# Patient Record
Sex: MALE | Race: ASIAN | NOT HISPANIC OR LATINO | Employment: UNEMPLOYED | URBAN - METROPOLITAN AREA
[De-identification: names, ages, dates, MRNs, and addresses within clinical notes are randomized per-mention and may not be internally consistent; named-entity substitution may affect disease eponyms.]

---

## 2018-10-24 ENCOUNTER — TELEPHONE (OUTPATIENT)
Dept: FAMILY MEDICINE | Facility: CLINIC | Age: 38
End: 2018-10-24

## 2018-10-24 NOTE — TELEPHONE ENCOUNTER
Pre-Visit Chart Review  For Appointment Scheduled on 10/25/18    There are no preventive care reminders to display for this patient.

## 2018-10-25 ENCOUNTER — OFFICE VISIT (OUTPATIENT)
Dept: FAMILY MEDICINE | Facility: CLINIC | Age: 38
End: 2018-10-25
Payer: COMMERCIAL

## 2018-10-25 ENCOUNTER — TELEPHONE (OUTPATIENT)
Dept: FAMILY MEDICINE | Facility: CLINIC | Age: 38
End: 2018-10-25

## 2018-10-25 ENCOUNTER — HOSPITAL ENCOUNTER (OUTPATIENT)
Dept: RADIOLOGY | Facility: CLINIC | Age: 38
Discharge: HOME | End: 2018-10-25
Attending: FAMILY MEDICINE
Payer: COMMERCIAL

## 2018-10-25 ENCOUNTER — LAB VISIT (OUTPATIENT)
Dept: LAB | Facility: HOSPITAL | Age: 38
End: 2018-10-25
Attending: FAMILY MEDICINE
Payer: COMMERCIAL

## 2018-10-25 VITALS
DIASTOLIC BLOOD PRESSURE: 60 MMHG | HEART RATE: 70 BPM | HEIGHT: 67 IN | TEMPERATURE: 98 F | SYSTOLIC BLOOD PRESSURE: 113 MMHG | BODY MASS INDEX: 26.64 KG/M2 | WEIGHT: 169.75 LBS

## 2018-10-25 DIAGNOSIS — M54.41 CHRONIC BILATERAL LOW BACK PAIN WITH BILATERAL SCIATICA: ICD-10-CM

## 2018-10-25 DIAGNOSIS — M54.42 CHRONIC BILATERAL LOW BACK PAIN WITH BILATERAL SCIATICA: ICD-10-CM

## 2018-10-25 DIAGNOSIS — G89.29 CHRONIC BILATERAL LOW BACK PAIN WITH BILATERAL SCIATICA: ICD-10-CM

## 2018-10-25 DIAGNOSIS — R34 OLIGURIA: ICD-10-CM

## 2018-10-25 LAB
ALBUMIN SERPL BCP-MCNC: 4.3 G/DL
ALP SERPL-CCNC: 73 U/L
ALT SERPL W/O P-5'-P-CCNC: 14 U/L
ANION GAP SERPL CALC-SCNC: 7 MMOL/L
AST SERPL-CCNC: 19 U/L
BASOPHILS # BLD AUTO: 0.05 K/UL
BASOPHILS NFR BLD: 0.8 %
BILIRUB SERPL-MCNC: 0.4 MG/DL
BUN SERPL-MCNC: 18 MG/DL
CALCIUM SERPL-MCNC: 10 MG/DL
CHLORIDE SERPL-SCNC: 105 MMOL/L
CHOLEST SERPL-MCNC: 198 MG/DL
CHOLEST/HDLC SERPL: 4.4 {RATIO}
CO2 SERPL-SCNC: 28 MMOL/L
CREAT SERPL-MCNC: 0.9 MG/DL
DIFFERENTIAL METHOD: NORMAL
EOSINOPHIL # BLD AUTO: 0.4 K/UL
EOSINOPHIL NFR BLD: 5.8 %
ERYTHROCYTE [DISTWIDTH] IN BLOOD BY AUTOMATED COUNT: 12.5 %
EST. GFR  (AFRICAN AMERICAN): >60 ML/MIN/1.73 M^2
EST. GFR  (NON AFRICAN AMERICAN): >60 ML/MIN/1.73 M^2
FOLATE SERPL-MCNC: 5.9 NG/ML
GLUCOSE SERPL-MCNC: 87 MG/DL
HCT VFR BLD AUTO: 46 %
HDLC SERPL-MCNC: 45 MG/DL
HDLC SERPL: 22.7 %
HGB BLD-MCNC: 14.8 G/DL
IMM GRANULOCYTES # BLD AUTO: 0.01 K/UL
IMM GRANULOCYTES NFR BLD AUTO: 0.2 %
LDLC SERPL CALC-MCNC: 107 MG/DL
LYMPHOCYTES # BLD AUTO: 2 K/UL
LYMPHOCYTES NFR BLD: 33.3 %
MCH RBC QN AUTO: 30 PG
MCHC RBC AUTO-ENTMCNC: 32.2 G/DL
MCV RBC AUTO: 93 FL
MONOCYTES # BLD AUTO: 0.4 K/UL
MONOCYTES NFR BLD: 6.3 %
NEUTROPHILS # BLD AUTO: 3.3 K/UL
NEUTROPHILS NFR BLD: 53.6 %
NONHDLC SERPL-MCNC: 153 MG/DL
NRBC BLD-RTO: 0 /100 WBC
PLATELET # BLD AUTO: 237 K/UL
PMV BLD AUTO: 11 FL
POTASSIUM SERPL-SCNC: 4.3 MMOL/L
PROT SERPL-MCNC: 7.9 G/DL
RBC # BLD AUTO: 4.93 M/UL
RHEUMATOID FACT SERPL-ACNC: <10 IU/ML
SODIUM SERPL-SCNC: 140 MMOL/L
TRIGL SERPL-MCNC: 230 MG/DL
TSH SERPL DL<=0.005 MIU/L-ACNC: 0.62 UIU/ML
VIT B12 SERPL-MCNC: 742 PG/ML
WBC # BLD AUTO: 6.07 K/UL

## 2018-10-25 PROCEDURE — 84443 ASSAY THYROID STIM HORMONE: CPT

## 2018-10-25 PROCEDURE — 82746 ASSAY OF FOLIC ACID SERUM: CPT

## 2018-10-25 PROCEDURE — 3008F BODY MASS INDEX DOCD: CPT | Mod: CPTII,S$GLB,, | Performed by: FAMILY MEDICINE

## 2018-10-25 PROCEDURE — 72100 X-RAY EXAM L-S SPINE 2/3 VWS: CPT | Mod: 26,,, | Performed by: RADIOLOGY

## 2018-10-25 PROCEDURE — 86592 SYPHILIS TEST NON-TREP QUAL: CPT

## 2018-10-25 PROCEDURE — 80074 ACUTE HEPATITIS PANEL: CPT

## 2018-10-25 PROCEDURE — 86431 RHEUMATOID FACTOR QUANT: CPT

## 2018-10-25 PROCEDURE — 36415 COLL VENOUS BLD VENIPUNCTURE: CPT | Mod: PO

## 2018-10-25 PROCEDURE — 85025 COMPLETE CBC W/AUTO DIFF WBC: CPT

## 2018-10-25 PROCEDURE — 82607 VITAMIN B-12: CPT

## 2018-10-25 PROCEDURE — 99214 OFFICE O/P EST MOD 30 MIN: CPT | Mod: S$GLB,,, | Performed by: FAMILY MEDICINE

## 2018-10-25 PROCEDURE — 80053 COMPREHEN METABOLIC PANEL: CPT

## 2018-10-25 PROCEDURE — 86038 ANTINUCLEAR ANTIBODIES: CPT

## 2018-10-25 PROCEDURE — 80061 LIPID PANEL: CPT

## 2018-10-25 PROCEDURE — 99999 PR PBB SHADOW E&M-EST. PATIENT-LVL III: CPT | Mod: PBBFAC,,, | Performed by: FAMILY MEDICINE

## 2018-10-25 PROCEDURE — 72100 X-RAY EXAM L-S SPINE 2/3 VWS: CPT | Mod: TC,FY,PO

## 2018-10-25 NOTE — TELEPHONE ENCOUNTER
----- Message from Davi Monroy sent at 10/23/2018  9:20 AM CDT -----  Contact: self   Type:  Sooner Apoointment Request    Caller is requesting a sooner appointment.  Caller declined first available appointment listed below.  Caller will not accept being placed on the waitlist and is requesting a message be sent to doctor.    Name of Caller:  Patient   When is the first available appointment: February   Symptoms:  Est care back and kidney pain,   Best Call Back Number:  [phone] (home)        Additional Information: patient is requesting to be seen this week with a Dr, also would like a xray done on back and kidney

## 2018-10-25 NOTE — PROGRESS NOTES
Subjective:   Patient ID: Preet Barrientos is a 38 y.o. male     Chief Complaint:Establish Care (lower back pain, trouble urinating, urinating frequency with low volume)      Pt with chronic lower back pain that has been going on for years. Typically flares up every 3-4 months depneding on work schedule. Pt notes that when flares is unable to leave home for a week or so. Pt states pain is achey sever pain with shooting pains that radiate down back both legs. Has taken advil and tylenol with limited improvement. Pt also notes that he has been having decreased urination. Pt works as a  and sometimes works for multiple months without weekdays off. Works in [place]      Review of Systems   Constitutional: Negative for chills and fever.   HENT: Negative for sore throat.    Eyes: Negative for visual disturbance.   Respiratory: Negative for shortness of breath.    Cardiovascular: Negative for chest pain.   Gastrointestinal: Negative for abdominal pain.   Endocrine: Negative for polyuria.   Genitourinary: Positive for difficulty urinating and dysuria.   Musculoskeletal: Positive for back pain.   Skin: Negative for color change.   Neurological: Negative for headaches.   Psychiatric/Behavioral: Negative for agitation and confusion.     History reviewed. No pertinent past medical history.  Objective:     Vitals:    10/25/18 1054   BP: 113/60   Pulse: 70   Temp: 98 °F (36.7 °C)     Body mass index is 26.59 kg/m².  Physical Exam   Constitutional: He is oriented to person, place, and time. He appears well-developed and well-nourished.   HENT:   Head: Normocephalic and atraumatic.   Eyes: EOM are normal. Pupils are equal, round, and reactive to light.   Neck: Normal range of motion. Neck supple.   Cardiovascular: Normal rate, regular rhythm, normal heart sounds and intact distal pulses.   Pulmonary/Chest: Effort normal and breath sounds normal.   Abdominal: Soft. He exhibits no distension.   Musculoskeletal: Normal range of  motion.   Neurological: He is alert and oriented to person, place, and time.   Skin: Skin is warm and dry.   Psychiatric: He has a normal mood and affect. His behavior is normal. Judgment and thought content normal.   Nursing note and vitals reviewed.    Assessment:     1. Chronic bilateral low back pain with bilateral sciatica    2. Oliguria      Plan:   Chronic bilateral low back pain with bilateral sciatica  -     NELLA; Future; Expected date: 10/25/2018  -     Rheumatoid factor; Future; Expected date: 10/25/2018  -     X-Ray Lumbar Spine AP And Lateral; Future; Expected date: 10/25/2018  - review of xray shows narrowing of dis space at L4-L5      Oliguria  -     CBC auto differential; Future; Expected date: 10/25/2018  -     Comprehensive metabolic panel; Future; Expected date: 10/25/2018  -     HEPATITIS PANEL, ACUTE; Future; Expected date: 10/25/2018  -     TSH; Future; Expected date: 10/25/2018  -     Urinalysis; Future; Expected date: 10/25/2018  -     Vitamin B12; Future; Expected date: 10/25/2018  -     Folate; Future; Expected date: 10/25/2018  -     RPR; Future; Expected date: 10/25/2018  -     Lipid panel; Future; Expected date: 10/25/2018  - with patient symptoms of oliguria are consider about kidney dysfucntion  - pt also reports dark urine concerning for liver disease presentaiton with bilirubin in urine  - will order labwork to evaluate kidney dysfucntion as well as liver disease due to these presentations  - will followup with patient for possible further workup        Discharge:   Time spent with patient: 30 minutes and over half of that time was spent on counseling an coordination of care.    Barriers to medications present (no )    Adverse reactions to current medications (no)    Over the counter medications reviewed (Yes)      Juan Carlos Benitez MD  10/25/2018

## 2018-10-26 LAB
ANA SER QL IF: NORMAL
HAV IGM SERPL QL IA: NEGATIVE
HBV CORE IGM SERPL QL IA: NEGATIVE
HBV SURFACE AG SERPL QL IA: NEGATIVE
HCV AB SERPL QL IA: NEGATIVE
RPR SER QL: NORMAL

## 2018-10-29 ENCOUNTER — TELEPHONE (OUTPATIENT)
Dept: FAMILY MEDICINE | Facility: CLINIC | Age: 38
End: 2018-10-29

## 2018-10-29 NOTE — TELEPHONE ENCOUNTER
----- Message from Hiram Mahoney sent at 10/29/2018  8:38 AM CDT -----  Type: Needs Medical Advice    Who Called:  Patient      Best Call Back Number: [phone]  Additional Information: Patient calling.  States that he would like a letter that would allow him to return to work to be sent to his email address.requesting excuse of work note to return on 10/29 be email to [email or web address],requesting a call back with test results   He also states that he would like any prescription info or test results to be sent to his email address as well.    Patient states that he works nights and would like any phone calls to be made between 2 and 5 pm.

## 2020-06-29 ENCOUNTER — TELEPHONE (OUTPATIENT)
Dept: FAMILY MEDICINE | Facility: CLINIC | Age: 40
End: 2020-06-29

## 2020-06-29 NOTE — TELEPHONE ENCOUNTER
----- Message from Carolyn Mclean sent at 6/29/2020 12:57 PM CDT -----  Type:  Sooner Apoointment Request    Caller is requesting a sooner appointment.  Caller declined first available appointment listed below.  Caller will not accept being placed on the waitlist and is requesting a message be sent to doctor.    Name of Caller:  Patient  When is the first available appointment?  8/7  Symptoms:  cough  Best Call Back Number:    Additional Information:  please advise-thank you

## 2020-06-29 NOTE — TELEPHONE ENCOUNTER
----- Message from Tanvi Eng sent at 6/29/2020  3:06 PM CDT -----  Regarding: Pt - Orders for Covid  Contact: [phone]  Type: Needs Medical Advice  Who Called:  Patient  Best Call Back Number: [phone]  Additional Information: patient is requesting for an order to be tested for covid

## 2020-07-01 ENCOUNTER — TELEPHONE (OUTPATIENT)
Dept: FAMILY MEDICINE | Facility: CLINIC | Age: 40
End: 2020-07-01

## 2020-07-01 ENCOUNTER — OFFICE VISIT (OUTPATIENT)
Dept: PRIMARY CARE CLINIC | Facility: CLINIC | Age: 40
End: 2020-07-01
Payer: COMMERCIAL

## 2020-07-01 ENCOUNTER — APPOINTMENT (OUTPATIENT)
Dept: LAB | Facility: HOSPITAL | Age: 40
End: 2020-07-01
Attending: NURSE PRACTITIONER
Payer: COMMERCIAL

## 2020-07-01 VITALS
OXYGEN SATURATION: 99 % | SYSTOLIC BLOOD PRESSURE: 119 MMHG | HEART RATE: 77 BPM | TEMPERATURE: 98 F | RESPIRATION RATE: 18 BRPM | DIASTOLIC BLOOD PRESSURE: 70 MMHG

## 2020-07-01 DIAGNOSIS — J02.9 SORE THROAT: Primary | ICD-10-CM

## 2020-07-01 DIAGNOSIS — Z03.818 ENCOUNTER FOR OBSERVATION FOR SUSPECTED EXPOSURE TO OTHER BIOLOGICAL AGENTS RULED OUT: ICD-10-CM

## 2020-07-01 LAB — GROUP A STREP, MOLECULAR: NEGATIVE

## 2020-07-01 PROCEDURE — U0003 INFECTIOUS AGENT DETECTION BY NUCLEIC ACID (DNA OR RNA); SEVERE ACUTE RESPIRATORY SYNDROME CORONAVIRUS 2 (SARS-COV-2) (CORONAVIRUS DISEASE [COVID-19]), AMPLIFIED PROBE TECHNIQUE, MAKING USE OF HIGH THROUGHPUT TECHNOLOGIES AS DESCRIBED BY CMS-2020-01-R: HCPCS

## 2020-07-01 PROCEDURE — 87651 STREP A DNA AMP PROBE: CPT

## 2020-07-01 PROCEDURE — 99212 PR OFFICE/OUTPT VISIT, EST, LEVL II, 10-19 MIN: ICD-10-PCS | Mod: S$GLB,,, | Performed by: NURSE PRACTITIONER

## 2020-07-01 PROCEDURE — 99212 OFFICE O/P EST SF 10 MIN: CPT | Mod: S$GLB,,, | Performed by: NURSE PRACTITIONER

## 2020-07-01 NOTE — PATIENT INSTRUCTIONS

## 2020-07-01 NOTE — PROGRESS NOTES
Subjective:        Time seen by provider: 11:56 AM on 07/01/2020    Preet Barrientos is a 39 y.o. male who presents for an evaluation of possible COVID-19. He complains of a sore throat x1 day and a mild cough this morning. The patient denies taking daily medication, fever, or any other symptoms at this time. No pulmonary PMHx or PSHx. No known drug allergies.     Review of Systems   Constitutional: Negative for activity change, appetite change, fatigue and fever.   HENT: Positive for sore throat. Negative for congestion and rhinorrhea.    Respiratory: Positive for cough. Negative for chest tightness, shortness of breath and wheezing.    Cardiovascular: Negative for chest pain and palpitations.   Gastrointestinal: Negative for diarrhea, nausea and vomiting.   Musculoskeletal: Negative for arthralgias and myalgias.   Skin: Negative for rash.   Neurological: Negative for weakness, light-headedness and headaches.       Objective:      Physical Exam  Vitals signs and nursing note reviewed.   Constitutional:       General: He is not in acute distress.     Appearance: He is well-developed. He is not diaphoretic.   HENT:      Head: Normocephalic and atraumatic.      Nose: Nose normal.      Mouth/Throat:      Pharynx: Uvula midline. Posterior oropharyngeal erythema present. No oropharyngeal exudate.   Eyes:      Conjunctiva/sclera: Conjunctivae normal.   Neck:      Musculoskeletal: Normal range of motion.      Trachea: Phonation normal.   Cardiovascular:      Rate and Rhythm: Normal rate and regular rhythm.      Heart sounds: Normal heart sounds. No murmur.   Pulmonary:      Effort: Pulmonary effort is normal. No respiratory distress.      Breath sounds: Normal breath sounds. No wheezing.   Musculoskeletal: Normal range of motion.   Skin:     General: Skin is warm and dry.   Neurological:      Mental Status: He is alert and oriented to person, place, and time.         Assessment:       1. Viral URI  Plan:       1. Viral URI  -  The patient appears to have a viral upper respiratory infection.  COVID-19 TEST PENDING.  RAPID STREP TEST NEGATIVE.  I DO NOT THINK STREP PHARYNGITIS.  Based upon the history and physical exam the patient does not appear to have a serious bacterial infection such as pneumonia, sepsis, otitis media, bacterial sinusitis, strep pharyngitis, parapharyngeal or peritonsillar abscess, meningitis.  Patient appears very well and I have given specific return precautions to the patient and/or family members.  The patient can take over the counter medications and does not appear to need antibiotics at this time.     2. Discharge home and await results.   3. Return to clinic or ED for new or worsening symptoms.   4. Follow-up with PCP as needed.     Scribe Attestation:   I, Edith Wong, am scribing for, and in the presence of, GABBY Claudio. I performed the above scribed service and the documentation accurately describes the services I performed. I attest to the accuracy of the note.    I, GABBY Claudio, personally performed the services described in this documentation. All medical record entries made by the scribe were at my direction and in my presence.  I have reviewed the chart and agree that the record reflects my personal performance and is accurate and complete. GABBY Claudio.  1:37 PM 07/01/2020

## 2020-07-01 NOTE — TELEPHONE ENCOUNTER
Patient has not been seen in over a year. Rescheduled patient's visit to sooner to discuss with provider.

## 2020-07-01 NOTE — TELEPHONE ENCOUNTER
----- Message from Tanvi Eng sent at 7/1/2020  9:09 AM CDT -----  Regarding: Pt Order for Xray and  Type: Needs Medical Advice  Who Called:  Patient    Best Call Back Number: [phone]  Additional Information: Patient is also requesting for an order for Xray and US. Please call back and advise

## 2020-07-01 NOTE — TELEPHONE ENCOUNTER
----- Message from Tanvi Eng sent at 7/1/2020  9:07 AM CDT -----  Regarding: Patient Order COVID  Contact: [phone]  Type: Needs Medical Advice  Who Called:  Pt    Best Call Back Number: [phone]    Additional Information: Patient is requesting an order for COVID test. Please call back and advise

## 2020-07-02 LAB — SARS-COV-2 RNA RESP QL NAA+PROBE: NOT DETECTED

## 2020-07-06 ENCOUNTER — HOSPITAL ENCOUNTER (OUTPATIENT)
Dept: RADIOLOGY | Facility: CLINIC | Age: 40
Discharge: HOME | End: 2020-07-06
Attending: NURSE PRACTITIONER
Payer: COMMERCIAL

## 2020-07-06 ENCOUNTER — LAB VISIT (OUTPATIENT)
Dept: LAB | Facility: HOSPITAL | Age: 40
End: 2020-07-06
Attending: NURSE PRACTITIONER
Payer: COMMERCIAL

## 2020-07-06 ENCOUNTER — OFFICE VISIT (OUTPATIENT)
Dept: FAMILY MEDICINE | Facility: CLINIC | Age: 40
End: 2020-07-06
Payer: COMMERCIAL

## 2020-07-06 VITALS
HEART RATE: 62 BPM | SYSTOLIC BLOOD PRESSURE: 128 MMHG | DIASTOLIC BLOOD PRESSURE: 80 MMHG | TEMPERATURE: 98 F | OXYGEN SATURATION: 98 % | HEIGHT: 67 IN | WEIGHT: 169.31 LBS | BODY MASS INDEX: 26.57 KG/M2

## 2020-07-06 DIAGNOSIS — R06.02 SOB (SHORTNESS OF BREATH) ON EXERTION: ICD-10-CM

## 2020-07-06 DIAGNOSIS — Z00.00 ANNUAL PHYSICAL EXAM: Primary | ICD-10-CM

## 2020-07-06 DIAGNOSIS — Z00.00 ANNUAL PHYSICAL EXAM: ICD-10-CM

## 2020-07-06 DIAGNOSIS — F17.200 NEEDS SMOKING CESSATION EDUCATION: ICD-10-CM

## 2020-07-06 LAB
ALBUMIN SERPL BCP-MCNC: 4.4 G/DL (ref 3.5–5.2)
ALP SERPL-CCNC: 68 U/L (ref 55–135)
ALT SERPL W/O P-5'-P-CCNC: 18 U/L (ref 10–44)
ANION GAP SERPL CALC-SCNC: 7 MMOL/L (ref 8–16)
AST SERPL-CCNC: 20 U/L (ref 10–40)
BASOPHILS # BLD AUTO: 0.05 K/UL (ref 0–0.2)
BASOPHILS NFR BLD: 0.7 % (ref 0–1.9)
BILIRUB SERPL-MCNC: 0.3 MG/DL (ref 0.1–1)
BUN SERPL-MCNC: 11 MG/DL (ref 6–20)
CALCIUM SERPL-MCNC: 10 MG/DL (ref 8.7–10.5)
CHLORIDE SERPL-SCNC: 103 MMOL/L (ref 95–110)
CHOLEST SERPL-MCNC: 249 MG/DL (ref 120–199)
CHOLEST/HDLC SERPL: 4.5 {RATIO} (ref 2–5)
CO2 SERPL-SCNC: 29 MMOL/L (ref 23–29)
CREAT SERPL-MCNC: 0.9 MG/DL (ref 0.5–1.4)
DIFFERENTIAL METHOD: ABNORMAL
EOSINOPHIL # BLD AUTO: 0.4 K/UL (ref 0–0.5)
EOSINOPHIL NFR BLD: 6.1 % (ref 0–8)
ERYTHROCYTE [DISTWIDTH] IN BLOOD BY AUTOMATED COUNT: 12.6 % (ref 11.5–14.5)
EST. GFR  (AFRICAN AMERICAN): >60 ML/MIN/1.73 M^2
EST. GFR  (NON AFRICAN AMERICAN): >60 ML/MIN/1.73 M^2
GLUCOSE SERPL-MCNC: 96 MG/DL (ref 70–110)
HCT VFR BLD AUTO: 47.3 % (ref 40–54)
HDLC SERPL-MCNC: 55 MG/DL (ref 40–75)
HDLC SERPL: 22.1 % (ref 20–50)
HGB BLD-MCNC: 15 G/DL (ref 14–18)
IMM GRANULOCYTES # BLD AUTO: 0.02 K/UL (ref 0–0.04)
IMM GRANULOCYTES NFR BLD AUTO: 0.3 % (ref 0–0.5)
LDLC SERPL CALC-MCNC: 137.2 MG/DL (ref 63–159)
LYMPHOCYTES # BLD AUTO: 2.2 K/UL (ref 1–4.8)
LYMPHOCYTES NFR BLD: 32.7 % (ref 18–48)
MCH RBC QN AUTO: 30.5 PG (ref 27–31)
MCHC RBC AUTO-ENTMCNC: 31.7 G/DL (ref 32–36)
MCV RBC AUTO: 96 FL (ref 82–98)
MONOCYTES # BLD AUTO: 0.5 K/UL (ref 0.3–1)
MONOCYTES NFR BLD: 7.7 % (ref 4–15)
NEUTROPHILS # BLD AUTO: 3.5 K/UL (ref 1.8–7.7)
NEUTROPHILS NFR BLD: 52.5 % (ref 38–73)
NONHDLC SERPL-MCNC: 194 MG/DL
NRBC BLD-RTO: 0 /100 WBC
PLATELET # BLD AUTO: 238 K/UL (ref 150–350)
PMV BLD AUTO: 10.9 FL (ref 9.2–12.9)
POTASSIUM SERPL-SCNC: 5.2 MMOL/L (ref 3.5–5.1)
PROT SERPL-MCNC: 7.8 G/DL (ref 6–8.4)
RBC # BLD AUTO: 4.92 M/UL (ref 4.6–6.2)
SODIUM SERPL-SCNC: 139 MMOL/L (ref 136–145)
TRIGL SERPL-MCNC: 284 MG/DL (ref 30–150)
TSH SERPL DL<=0.005 MIU/L-ACNC: 1.64 UIU/ML (ref 0.4–4)
WBC # BLD AUTO: 6.73 K/UL (ref 3.9–12.7)

## 2020-07-06 PROCEDURE — 3008F PR BODY MASS INDEX (BMI) DOCUMENTED: ICD-10-PCS | Mod: CPTII,S$GLB,, | Performed by: NURSE PRACTITIONER

## 2020-07-06 PROCEDURE — 71046 X-RAY EXAM CHEST 2 VIEWS: CPT | Mod: TC,FY,PO

## 2020-07-06 PROCEDURE — 99999 PR PBB SHADOW E&M-EST. PATIENT-LVL IV: ICD-10-PCS | Mod: PBBFAC,,, | Performed by: NURSE PRACTITIONER

## 2020-07-06 PROCEDURE — 80061 LIPID PANEL: CPT

## 2020-07-06 PROCEDURE — 99214 PR OFFICE/OUTPT VISIT, EST, LEVL IV, 30-39 MIN: ICD-10-PCS | Mod: S$GLB,,, | Performed by: NURSE PRACTITIONER

## 2020-07-06 PROCEDURE — 71046 X-RAY EXAM CHEST 2 VIEWS: CPT | Mod: 26,,, | Performed by: RADIOLOGY

## 2020-07-06 PROCEDURE — 85025 COMPLETE CBC W/AUTO DIFF WBC: CPT

## 2020-07-06 PROCEDURE — 80053 COMPREHEN METABOLIC PANEL: CPT

## 2020-07-06 PROCEDURE — 3008F BODY MASS INDEX DOCD: CPT | Mod: CPTII,S$GLB,, | Performed by: NURSE PRACTITIONER

## 2020-07-06 PROCEDURE — 99999 PR PBB SHADOW E&M-EST. PATIENT-LVL IV: CPT | Mod: PBBFAC,,, | Performed by: NURSE PRACTITIONER

## 2020-07-06 PROCEDURE — 99214 OFFICE O/P EST MOD 30 MIN: CPT | Mod: S$GLB,,, | Performed by: NURSE PRACTITIONER

## 2020-07-06 PROCEDURE — 71046 XR CHEST PA AND LATERAL: ICD-10-PCS | Mod: 26,,, | Performed by: RADIOLOGY

## 2020-07-06 PROCEDURE — 84443 ASSAY THYROID STIM HORMONE: CPT

## 2020-07-06 PROCEDURE — 36415 COLL VENOUS BLD VENIPUNCTURE: CPT | Mod: PO

## 2020-07-06 PROCEDURE — 83036 HEMOGLOBIN GLYCOSYLATED A1C: CPT

## 2020-07-06 NOTE — PROGRESS NOTES
"Subjective:       Patient ID: Preet Barrientos is a 39 y.o. male presents to the clinic for annual exam. This patient is new to me. Medical history listed below. He is concerned about his lung function due to smoking. He has a 7.5 ppd smoking history. He reports having shortness of breath on exertion occasionally for the past two months.   Chief Complaint: Annual Exam      Vitals:    07/06/20 0907   BP: 128/80   Pulse: 62   Temp: 98.3 °F (36.8 °C)   SpO2: 98%   Weight: 76.8 kg (169 lb 5 oz)   Height: 5' 7" (1.702 m)   PainSc: 0-No pain     Body mass index is 26.52 kg/m².    History reviewed. No pertinent past medical history.  History reviewed. No pertinent surgical history.  Social History     Socioeconomic History    Marital status:      Spouse name: Not on file    Number of children: Not on file    Years of education: Not on file    Highest education level: Not on file   Occupational History    Not on file   Social Needs    Financial resource strain: Not on file    Food insecurity     Worry: Not on file     Inability: Not on file    Transportation needs     Medical: Not on file     Non-medical: Not on file   Tobacco Use    Smoking status: Current Every Day Smoker    Smokeless tobacco: Never Used   Substance and Sexual Activity    Alcohol use: Yes    Drug use: Not on file    Sexual activity: Not on file   Lifestyle    Physical activity     Days per week: Not on file     Minutes per session: Not on file    Stress: Not on file   Relationships    Social connections     Talks on phone: Not on file     Gets together: Not on file     Attends Scientologist service: Not on file     Active member of club or organization: Not on file     Attends meetings of clubs or organizations: Not on file     Relationship status: Not on file   Other Topics Concern    Not on file   Social History Narrative    Not on file       Review of patient's allergies indicates:  No Known Allergies  No current outpatient medications " on file.    Review of Systems   Constitutional: Negative for activity change, appetite change, chills, diaphoresis, fatigue, fever and unexpected weight change.   HENT: Negative for congestion, ear discharge, ear pain, hearing loss, postnasal drip, rhinorrhea, sinus pressure, sinus pain, sore throat, trouble swallowing and voice change.    Eyes: Negative for photophobia, pain, discharge and visual disturbance.   Respiratory: Negative for apnea, cough, chest tightness, shortness of breath and wheezing.    Cardiovascular: Negative for chest pain, palpitations and leg swelling.   Gastrointestinal: Negative for abdominal distention, abdominal pain, constipation, diarrhea, nausea and vomiting.   Endocrine: Negative for polydipsia, polyphagia and polyuria.   Genitourinary: Negative for decreased urine volume, difficulty urinating, dysuria, flank pain, frequency and urgency.   Musculoskeletal: Negative for arthralgias, back pain, gait problem, joint swelling and neck pain.   Skin: Negative for color change, pallor, rash and wound.   Neurological: Negative for dizziness, facial asymmetry, speech difficulty, weakness, light-headedness, numbness and headaches.   Hematological: Negative for adenopathy.   Psychiatric/Behavioral: Negative for agitation, behavioral problems, confusion, decreased concentration, hallucinations and sleep disturbance. The patient is not nervous/anxious.        Objective:      Physical Exam  Vitals signs and nursing note reviewed.   Constitutional:       General: He is not in acute distress.     Appearance: Normal appearance. He is well-developed. He is obese. He is not ill-appearing, toxic-appearing or diaphoretic.   HENT:      Head: Normocephalic and atraumatic.      Right Ear: Tympanic membrane, ear canal and external ear normal. There is no impacted cerumen.      Left Ear: Tympanic membrane, ear canal and external ear normal.      Nose: Nose normal. No congestion or rhinorrhea.      Mouth/Throat:       Mouth: Mucous membranes are moist.      Pharynx: Oropharynx is clear. No oropharyngeal exudate or posterior oropharyngeal erythema.   Eyes:      General: No scleral icterus.        Right eye: No discharge.         Left eye: No discharge.      Extraocular Movements: Extraocular movements intact.      Conjunctiva/sclera: Conjunctivae normal.      Pupils: Pupils are equal, round, and reactive to light.   Neck:      Musculoskeletal: Normal range of motion and neck supple. No neck rigidity or muscular tenderness.      Thyroid: No thyromegaly.      Vascular: No carotid bruit or JVD.      Trachea: No tracheal deviation.   Cardiovascular:      Rate and Rhythm: Normal rate and regular rhythm.      Pulses: Normal pulses.      Heart sounds: Normal heart sounds. No murmur. No friction rub. No gallop.    Pulmonary:      Effort: Pulmonary effort is normal. No respiratory distress.      Breath sounds: Normal breath sounds. No stridor. No wheezing, rhonchi or rales.   Chest:      Chest wall: No tenderness.   Abdominal:      General: Abdomen is flat. Bowel sounds are normal. There is no distension.      Palpations: Abdomen is soft. There is no mass.      Tenderness: There is no abdominal tenderness. There is no right CVA tenderness, left CVA tenderness, guarding or rebound.      Hernia: No hernia is present.   Musculoskeletal: Normal range of motion.         General: No swelling, tenderness, deformity or signs of injury.      Right lower leg: No edema.      Left lower leg: No edema.   Lymphadenopathy:      Cervical: No cervical adenopathy.   Skin:     General: Skin is warm and dry.      Capillary Refill: Capillary refill takes less than 2 seconds.      Coloration: Skin is not jaundiced or pale.      Findings: No bruising, erythema, lesion or rash.   Neurological:      General: No focal deficit present.      Mental Status: He is alert and oriented to person, place, and time.      Cranial Nerves: No cranial nerve deficit.       "Sensory: No sensory deficit.      Motor: No weakness or abnormal muscle tone.      Coordination: Coordination normal.      Gait: Gait normal.      Deep Tendon Reflexes: Reflexes normal.   Psychiatric:         Mood and Affect: Mood normal.         Behavior: Behavior normal.         Thought Content: Thought content normal.         Judgment: Judgment normal.         Assessment:       1. Annual physical exam    2. SOB (shortness of breath) on exertion    3. BMI 26.0-26.9,adult    4. Needs smoking cessation education        Plan:    Preet was seen today for annual exam.    Diagnoses and all orders for this visit:    Annual physical exam  -     CBC auto differential; Future  -     Comprehensive metabolic panel; Future  -     Hemoglobin A1C; Future  -     Lipid Panel; Future  -     TSH; Future    SOB (shortness of breath) on exertion  -     X-Ray Chest PA And Lateral; Future    BMI 26.0-26.9,adult  Counseled patient on his ideal body weight, health consequences of being obese and current recommendations including weekly exercise and a heart healthy diet.  Current BMI is:Estimated body mass index is 26.52 kg/m² as calculated from the following:    Height as of this encounter: 5' 7" (1.702 m).    Weight as of this encounter: 76.8 kg (169 lb 5 oz)..  Patient is aware that ideal BMI < 25 or Weight in (lb) to have BMI = 25: 159.3.    Needs smoking cessation education  -     Ambulatory referral/consult to Smoking Cessation Program; Future    Patient counseled on smoking cessation. I discussed options such as nicotine replacement products, wellbutrin, and chantix.  Side effects, benefits and risks were discussed regarding each.  Printed materials were given.  I offered a referral to [facility] smoking cessation program.  All questions were answered.  Patient education provided.  All questions and concerns addressed  Patient verbalizes understanding      Follow up in about 1 year (around 7/6/2021).         "

## 2020-07-06 NOTE — PATIENT INSTRUCTIONS
The Benefits of Living Smoke Free  What do you want to gain from quitting? Check off some reasons to quit.  Health benefits  ___  Improve my ability to breathe without coughing or shortness of breath  ___  Reduce my risk of lung cancer, heart disease, chronic lung disease  ___  Have fewer wrinkles and softer skin  ___  Improve my sense of taste and smell  ___  For pregnant women--reduce the risk of having a miscarriage, stillbirth, premature birth, or low-birth-weight baby  Personal benefits  ___  Feel more in control of my life  ___  Have better-smelling hair, breath, clothes, home, and car  ___  Save time by not having to take smoke breaks, buy cigarettes, or hunt for a light  ___  Have whiter teeth  Family benefits  ___  Reduce my childrens respiratory tract infections  ___  Set a good example for my children  ___  Reduce my familys cancer risk  Financial benefits  ___  Save hundreds of dollars each year that would be spent on cigarettes  ___  Save money on medical bills  ___  Save on life, health, and car insurance premiums     Those dollars add up!  Cigarettes are expensive, and getting more expensive all the time. Do you realize how much money you are spending on cigarettes per year? What is the average amount you spend on a pack of cigarettes? What is the average number of packs that you smoke per day? Using your answers to these questions, fill in this formula to help you find out:  ($ _____ per pack) ×  ( _____ number of packs per day) × (365 days) =  $ _____ yearly cost of smoking  Besides tobacco, there are other costs, including extra cleaning bills and replacement costs for clothing and furniture; medical expenses for smoking-related illnesses; and higher health, life, and car insurance premiums.  Cigars and pipes count too!  Cigars and pipes are also dangerous. So are smokeless (chewing) tobacco and snuff. All of these products contain nicotine, a highly addictive substance that has harmful effects  on your body. Quitting smoking means giving up all tobacco products.      For more information  · https://smokefree.gov/pkkm-is-yn-expert  · National Cancer [place] Smoking Quitline: 877-44U-QUIT (320-099-5158)   Date Last Reviewed: 2/1/2017  © 7676-0054 "[employer]". [address] [place] [address], [place] [address]. All rights reserved. This information is not intended as a substitute for professional medical care. Always follow your healthcare professional's instructions.        Kicking the Smoking Habit  If you smoke, quitting is one of the best changes you can make for your heart and your overall health. Your risk of heart attack goes down within one day of putting out that last cigarette. As you go longer without smoking, your risk goes down even more. Quitting isnt easy, but millions of people have done it. You can, too. Its never too late to quit.  Getting started  Boost your chances of success by deciding on your quit plan. Your health care provider and cardiac rehab team can help you develop this plan. Even if youve already quit, its easy to slip back into smoking.  Your plan can help you avoid and recover from relapse.  In any case, start by setting a date to quit within a month, and do it.    Keys to your quit plan  · Talk to your healthcare provider about prescription medicines and nicotine replacement products that help stop the urge to smoke.   · Join a support group or quit smoking program. Talking with others about the challenges of quitting can help you get through them.  · Ask other smokers in your household to quit with you.  · Look for the cues in your life that you associate with smoking and avoid them.  Track your triggers  What gives you that R-ujpq-a-cigarette feeling? List all the situations that make you want a cigarette. Then think of other ways to deal with these situations. Here are some examples:  Situation How I'll handle it   Finishing a meal Get up from the table and  take a walk   Having an argument Find a quiet place and breathe deeply   Feeling lonely or bored Call a friend to talk         Tips for quitting successfully  · List the benefits of quitting such as reducing heart risks and saving money. Keep this list and review it whenever you feel like smoking.  · Get support. Let your friends know you may call them to chat when you have an urge to smoke.  · If youve tried to quit before without success, this time avoid the triggers that may cause the relapse.  · Make the most of slip-ups. Try to learn from them, and then get back on track.  · Be accountable to your friends and your calendar so that you stay on track.  For family and friends  · Be supportive and patient. Quitting smoking can be difficult and stressful.  · If you smoke, nows a great time to quit. Even if you dont quit, never smoke around your loved one. Secondhand smoke is dangerous to his or her heart.  · The best goals are accomplished in teams. Remember that when your loved one states he or she wants to stop smoking.  Date Last Reviewed: 7/1/2016  © 0844-3693 [employer]. [address] [place] [address], [place] [address]. All rights reserved. This information is not intended as a substitute for professional medical care. Always follow your healthcare professional's instructions.

## 2020-07-07 LAB
ESTIMATED AVG GLUCOSE: 97 MG/DL (ref 68–131)
HBA1C MFR BLD HPLC: 5 % (ref 4–5.6)

## 2020-07-08 DIAGNOSIS — E78.2 ELEVATED CHOLESTEROL WITH HIGH TRIGLYCERIDES: Primary | ICD-10-CM

## 2020-08-24 ENCOUNTER — OFFICE VISIT (OUTPATIENT)
Dept: URGENT CARE | Facility: CLINIC | Age: 40
End: 2020-08-24
Payer: COMMERCIAL

## 2020-08-24 VITALS
DIASTOLIC BLOOD PRESSURE: 78 MMHG | HEART RATE: 73 BPM | TEMPERATURE: 99 F | SYSTOLIC BLOOD PRESSURE: 123 MMHG | BODY MASS INDEX: 26.62 KG/M2 | WEIGHT: 169.63 LBS | OXYGEN SATURATION: 98 % | HEIGHT: 67 IN

## 2020-08-24 DIAGNOSIS — T63.444A LOCAL REACTION TO BEE STING, UNDETERMINED INTENT, INITIAL ENCOUNTER: Primary | ICD-10-CM

## 2020-08-24 PROCEDURE — 99204 OFFICE O/P NEW MOD 45 MIN: CPT | Mod: 25,S$GLB,, | Performed by: NURSE PRACTITIONER

## 2020-08-24 PROCEDURE — 96372 PR INJECTION,THERAP/PROPH/DIAG2ST, IM OR SUBCUT: ICD-10-PCS | Mod: S$GLB,,, | Performed by: NURSE PRACTITIONER

## 2020-08-24 PROCEDURE — 96372 THER/PROPH/DIAG INJ SC/IM: CPT | Mod: S$GLB,,, | Performed by: NURSE PRACTITIONER

## 2020-08-24 PROCEDURE — 99204 PR OFFICE/OUTPT VISIT, NEW, LEVL IV, 45-59 MIN: ICD-10-PCS | Mod: 25,S$GLB,, | Performed by: NURSE PRACTITIONER

## 2020-08-24 RX ORDER — DEXAMETHASONE SODIUM PHOSPHATE 4 MG/ML
8 INJECTION, SOLUTION INTRA-ARTICULAR; INTRALESIONAL; INTRAMUSCULAR; INTRAVENOUS; SOFT TISSUE
Status: COMPLETED | OUTPATIENT
Start: 2020-08-24 | End: 2020-08-24

## 2020-08-24 RX ORDER — HYDROXYZINE HYDROCHLORIDE 50 MG/1
50 TABLET, FILM COATED ORAL 4 TIMES DAILY PRN
Qty: 30 TABLET | Refills: 0 | Status: SHIPPED | OUTPATIENT
Start: 2020-08-24 | End: 2022-02-07 | Stop reason: ALTCHOICE

## 2020-08-24 RX ORDER — PREDNISONE 20 MG/1
20 TABLET ORAL 2 TIMES DAILY
Qty: 10 TABLET | Refills: 0 | Status: SHIPPED | OUTPATIENT
Start: 2020-08-24 | End: 2020-08-29

## 2020-08-24 RX ADMIN — DEXAMETHASONE SODIUM PHOSPHATE 8 MG: 4 INJECTION, SOLUTION INTRA-ARTICULAR; INTRALESIONAL; INTRAMUSCULAR; INTRAVENOUS; SOFT TISSUE at 12:08

## 2020-08-24 NOTE — PROGRESS NOTES
"Subjective:       Patient ID: Preet Barrientos is a 40 y.o. male.    Vitals:  height is 5' 7" (1.702 m) and weight is 76.9 kg (169 lb 9.6 oz). His oral temperature is 99.4 °F (37.4 °C). His blood pressure is 123/78 and his pulse is 73. His oxygen saturation is 98%.     Chief Complaint: Insect Bite    Stung by a bee/wasp yesterday, woke up with left arm swollen, tender and red. Skin feels tight with the swelling  Denies numbness or tingling to extremity    Insect Bite  This is a new problem. The current episode started yesterday. The problem occurs constantly. The problem has been gradually worsening. Pertinent negatives include no chills, congestion, coughing, fever, headaches, myalgias, rash, sore throat or vomiting. Treatments tried: Toothpaste. The treatment provided no relief.       Constitution: Negative for appetite change, chills and fever.   HENT: Negative for ear pain, congestion and sore throat.    Neck: Negative for painful lymph nodes.   Eyes: Negative for eye discharge and eye redness.   Respiratory: Negative for cough.    Gastrointestinal: Negative for vomiting and diarrhea.   Genitourinary: Negative for dysuria.   Musculoskeletal: Positive for pain. Negative for muscle ache.   Skin: Positive for erythema. Negative for rash.   Neurological: Negative for headaches and seizures.   Hematologic/Lymphatic: Negative for swollen lymph nodes.       Objective:      Physical Exam   Constitutional: He is oriented to person, place, and time. He appears well-developed.   HENT:   Head: Normocephalic and atraumatic. Head is without abrasion, without contusion and without laceration.   Ears:   Right Ear: External ear normal.   Left Ear: External ear normal.   Nose: Nose normal.   Mouth/Throat: Oropharynx is clear and moist and mucous membranes are normal.   Eyes: Pupils are equal, round, and reactive to light. Conjunctivae, EOM and lids are normal.   Neck: Trachea normal, full passive range of motion without pain and " phonation normal. Neck supple.   Cardiovascular: Normal rate, regular rhythm and normal heart sounds.   Pulmonary/Chest: Effort normal and breath sounds normal. No stridor. No respiratory distress.   Musculoskeletal: Normal range of motion.      Left forearm: He exhibits tenderness and swelling.        Arms:    Neurological: He is alert and oriented to person, place, and time.   Skin: Skin is warm, dry, intact and no rash. Capillary refill takes less than 2 seconds. Lesions:  erythemaabrasion, burn, bruising and ecchymosisPsychiatric: His speech is normal and behavior is normal. Judgment and thought content normal.   Nursing note and vitals reviewed.        Assessment:       1. Local reaction to bee sting, undetermined intent, initial encounter        Plan:     recommended to go to hospital if swelling increases or for any other concerns    Local reaction to bee sting, undetermined intent, initial encounter    Other orders  -     dexamethasone injection 8 mg  -     predniSONE (DELTASONE) 20 MG tablet; Take 1 tablet (20 mg total) by mouth 2 (two) times daily. for 5 days  Dispense: 10 tablet; Refill: 0  -     hydrOXYzine (ATARAX) 50 MG tablet; Take 1 tablet (50 mg total) by mouth 4 (four) times daily as needed for Itching.  Dispense: 30 tablet; Refill: 0

## 2020-08-27 ENCOUNTER — TELEPHONE (OUTPATIENT)
Dept: URGENT CARE | Facility: CLINIC | Age: 40
End: 2020-08-27

## 2021-05-12 ENCOUNTER — PATIENT MESSAGE (OUTPATIENT)
Dept: RESEARCH | Facility: HOSPITAL | Age: 41
End: 2021-05-12

## 2021-10-07 ENCOUNTER — PATIENT MESSAGE (OUTPATIENT)
Dept: ADMINISTRATIVE | Facility: HOSPITAL | Age: 41
End: 2021-10-07

## 2022-01-21 ENCOUNTER — OFFICE VISIT (OUTPATIENT)
Dept: URGENT CARE | Facility: CLINIC | Age: 42
End: 2022-01-21
Payer: COMMERCIAL

## 2022-01-21 VITALS
DIASTOLIC BLOOD PRESSURE: 70 MMHG | BODY MASS INDEX: 26.37 KG/M2 | OXYGEN SATURATION: 98 % | WEIGHT: 168 LBS | TEMPERATURE: 98 F | HEIGHT: 67 IN | HEART RATE: 70 BPM | RESPIRATION RATE: 16 BRPM | SYSTOLIC BLOOD PRESSURE: 109 MMHG

## 2022-01-21 DIAGNOSIS — L60.8 NAIL DEFORMITY: Primary | ICD-10-CM

## 2022-01-21 PROCEDURE — 3074F PR MOST RECENT SYSTOLIC BLOOD PRESSURE < 130 MM HG: ICD-10-PCS | Mod: CPTII,S$GLB,, | Performed by: NURSE PRACTITIONER

## 2022-01-21 PROCEDURE — 1159F PR MEDICATION LIST DOCUMENTED IN MEDICAL RECORD: ICD-10-PCS | Mod: CPTII,S$GLB,, | Performed by: NURSE PRACTITIONER

## 2022-01-21 PROCEDURE — 3078F PR MOST RECENT DIASTOLIC BLOOD PRESSURE < 80 MM HG: ICD-10-PCS | Mod: CPTII,S$GLB,, | Performed by: NURSE PRACTITIONER

## 2022-01-21 PROCEDURE — 1159F MED LIST DOCD IN RCRD: CPT | Mod: CPTII,S$GLB,, | Performed by: NURSE PRACTITIONER

## 2022-01-21 PROCEDURE — 3074F SYST BP LT 130 MM HG: CPT | Mod: CPTII,S$GLB,, | Performed by: NURSE PRACTITIONER

## 2022-01-21 PROCEDURE — 1160F PR REVIEW ALL MEDS BY PRESCRIBER/CLIN PHARMACIST DOCUMENTED: ICD-10-PCS | Mod: CPTII,S$GLB,, | Performed by: NURSE PRACTITIONER

## 2022-01-21 PROCEDURE — 3008F BODY MASS INDEX DOCD: CPT | Mod: CPTII,S$GLB,, | Performed by: NURSE PRACTITIONER

## 2022-01-21 PROCEDURE — 3078F DIAST BP <80 MM HG: CPT | Mod: CPTII,S$GLB,, | Performed by: NURSE PRACTITIONER

## 2022-01-21 PROCEDURE — 99213 OFFICE O/P EST LOW 20 MIN: CPT | Mod: S$GLB,,, | Performed by: NURSE PRACTITIONER

## 2022-01-21 PROCEDURE — 99213 PR OFFICE/OUTPT VISIT, EST, LEVL III, 20-29 MIN: ICD-10-PCS | Mod: S$GLB,,, | Performed by: NURSE PRACTITIONER

## 2022-01-21 PROCEDURE — 1160F RVW MEDS BY RX/DR IN RCRD: CPT | Mod: CPTII,S$GLB,, | Performed by: NURSE PRACTITIONER

## 2022-01-21 PROCEDURE — 3008F PR BODY MASS INDEX (BMI) DOCUMENTED: ICD-10-PCS | Mod: CPTII,S$GLB,, | Performed by: NURSE PRACTITIONER

## 2022-01-21 RX ORDER — CICLOPIROX 80 MG/ML
SOLUTION TOPICAL NIGHTLY
Qty: 6.6 ML | Refills: 0 | Status: SHIPPED | OUTPATIENT
Start: 2022-01-21 | End: 2022-02-07 | Stop reason: ALTCHOICE

## 2022-01-21 NOTE — PROGRESS NOTES
"Subjective:       Patient ID: Preet Barrientos is a 41 y.o. male.    Vitals:  height is 5' 7" (1.702 m) and weight is 76.2 kg (168 lb). His temperature is 98 °F (36.7 °C). His blood pressure is 109/70 and his pulse is 70. His respiration is 16 and oxygen saturation is 98%.     Chief Complaint: Nail Problem     About 4-5 of his nails on his hand became discolored and deformed. They are slightly brown and caving in. On a scale from 1-10 the pain is a 2 in the index finger, no pain of other fingers/nails. No injury to nails, no itching, no other skin abnromality. Has used nothing on them, no chemical exposure.    History reviewed. No pertinent past medical history.    History reviewed. No pertinent surgical history.    No family history on file.      Social History    Socioeconomic History      Marital status:     Tobacco Use      Smoking status: Current Every Day Smoker      Smokeless tobacco: Never Used    Substance and Sexual Activity      Alcohol use: Yes      Current Outpatient Medications:  ciclopirox (PENLAC) 8 % Soln, Apply topically nightly., Disp: 6.6 mL, Rfl: 0   hydrOXYzine (ATARAX) 50 MG tablet, Take 1 tablet (50 mg total) by mouth 4 (four) times daily as needed for Itching., Disp: 30 tablet, Rfl: 0    No current facility-administered medications for this visit.      Review of patient's allergies indicates:  No Known Allergies    Nail Problem  This is a new problem. The current episode started 1 to 4 weeks ago (3 weeks.). The problem occurs constantly. Pertinent negatives include no rash.       Constitution: Negative.   Respiratory: Negative.    Gastrointestinal: Negative.    Skin: Negative for rash.       Objective:      Physical Exam      Assessment:       1. Nail deformity          Plan:     Onychomycosis vs Lichen planus? Or other nail issue. Unable to confirm diagnosis in this clinic. Treat with topical ciclopirox for now, but needs dermatology follow up asap.     Nail deformity  -     Ambulatory " referral/consult to Dermatology  -     ciclopirox (PENLAC) 8 % Soln; Apply topically nightly.  Dispense: 6.6 mL; Refill: 0

## 2022-02-07 ENCOUNTER — OFFICE VISIT (OUTPATIENT)
Dept: FAMILY MEDICINE | Facility: CLINIC | Age: 42
End: 2022-02-07
Payer: COMMERCIAL

## 2022-02-07 VITALS
HEIGHT: 67 IN | HEART RATE: 76 BPM | DIASTOLIC BLOOD PRESSURE: 68 MMHG | WEIGHT: 168.19 LBS | OXYGEN SATURATION: 98 % | BODY MASS INDEX: 26.4 KG/M2 | SYSTOLIC BLOOD PRESSURE: 104 MMHG

## 2022-02-07 DIAGNOSIS — Z00.00 HEALTHCARE MAINTENANCE: Primary | ICD-10-CM

## 2022-02-07 PROCEDURE — 3078F DIAST BP <80 MM HG: CPT | Mod: CPTII,S$GLB,, | Performed by: FAMILY MEDICINE

## 2022-02-07 PROCEDURE — 3008F PR BODY MASS INDEX (BMI) DOCUMENTED: ICD-10-PCS | Mod: CPTII,S$GLB,, | Performed by: FAMILY MEDICINE

## 2022-02-07 PROCEDURE — 3008F BODY MASS INDEX DOCD: CPT | Mod: CPTII,S$GLB,, | Performed by: FAMILY MEDICINE

## 2022-02-07 PROCEDURE — 99999 PR PBB SHADOW E&M-EST. PATIENT-LVL III: CPT | Mod: PBBFAC,,, | Performed by: FAMILY MEDICINE

## 2022-02-07 PROCEDURE — 3074F PR MOST RECENT SYSTOLIC BLOOD PRESSURE < 130 MM HG: ICD-10-PCS | Mod: CPTII,S$GLB,, | Performed by: FAMILY MEDICINE

## 2022-02-07 PROCEDURE — 99999 PR PBB SHADOW E&M-EST. PATIENT-LVL III: ICD-10-PCS | Mod: PBBFAC,,, | Performed by: FAMILY MEDICINE

## 2022-02-07 PROCEDURE — 1159F MED LIST DOCD IN RCRD: CPT | Mod: CPTII,S$GLB,, | Performed by: FAMILY MEDICINE

## 2022-02-07 PROCEDURE — 3078F PR MOST RECENT DIASTOLIC BLOOD PRESSURE < 80 MM HG: ICD-10-PCS | Mod: CPTII,S$GLB,, | Performed by: FAMILY MEDICINE

## 2022-02-07 PROCEDURE — 1159F PR MEDICATION LIST DOCUMENTED IN MEDICAL RECORD: ICD-10-PCS | Mod: CPTII,S$GLB,, | Performed by: FAMILY MEDICINE

## 2022-02-07 PROCEDURE — 99395 PR PREVENTIVE VISIT,EST,18-39: ICD-10-PCS | Mod: S$GLB,,, | Performed by: FAMILY MEDICINE

## 2022-02-07 PROCEDURE — 99395 PREV VISIT EST AGE 18-39: CPT | Mod: S$GLB,,, | Performed by: FAMILY MEDICINE

## 2022-02-07 PROCEDURE — 3074F SYST BP LT 130 MM HG: CPT | Mod: CPTII,S$GLB,, | Performed by: FAMILY MEDICINE

## 2022-02-07 NOTE — PATIENT INSTRUCTIONS
If you are due for any health screening(s) below please notify me so we can arrange them to be ordered and scheduled to maintain your health.     Health Maintenance   Topic Date Due    TETANUS VACCINE  Never done    Lipid Panel  07/06/2025    Hepatitis C Screening  Completed

## 2022-02-08 NOTE — PROGRESS NOTES
Subjective:   Patient ID: Preet Barrientos is a 41 y.o. male     Chief Complaint:Annual Exam      Here for checkup    Review of Systems   Constitutional: Negative for chills and fever.   HENT: Negative for sore throat and trouble swallowing.    Respiratory: Negative for cough and shortness of breath.    Cardiovascular: Negative for chest pain and leg swelling.   Gastrointestinal: Negative for abdominal distention and abdominal pain.   Genitourinary: Negative for dysuria and flank pain.   Musculoskeletal: Negative for arthralgias and back pain.   Skin: Negative for color change and pallor.   Neurological: Negative for weakness and headaches.   Psychiatric/Behavioral: Negative for agitation and confusion.     No past medical history on file.  No past surgical history on file.  Objective:     Vitals:    02/07/22 1045   BP: 104/68   Pulse: 76     Body mass index is 26.35 kg/m².  Physical Exam  Vitals and nursing note reviewed.   Constitutional:       Appearance: He is well-developed and well-nourished.   HENT:      Head: Normocephalic and atraumatic.   Eyes:      General: No scleral icterus.     Conjunctiva/sclera: Conjunctivae normal.   Cardiovascular:      Heart sounds: No murmur heard.      Pulmonary:      Effort: Pulmonary effort is normal. No respiratory distress.   Musculoskeletal:         General: No deformity. Normal range of motion.      Cervical back: Normal range of motion and neck supple.   Skin:     Coloration: Skin is not pale.      Findings: No rash.   Neurological:      Mental Status: He is alert and oriented to person, place, and time.   Psychiatric:         Mood and Affect: Mood and affect normal.         Behavior: Behavior normal.         Thought Content: Thought content normal.         Judgment: Judgment normal.       Assessment:     1. Healthcare maintenance      Plan:   Healthcare maintenance  -     Lipid Panel; Future; Expected date: 02/07/2022  -     Comprehensive Metabolic Panel; Future; Expected  date: 02/07/2022  -     CBC Auto Differential; Future; Expected date: 02/07/2022  -     Hemoglobin A1C; Future; Expected date: 02/07/2022        Juan Carlos Benitez MD  02/08/2022    Portions of this note have been dictated with M Modal.

## 2022-02-12 ENCOUNTER — LAB VISIT (OUTPATIENT)
Dept: LAB | Facility: HOSPITAL | Age: 42
End: 2022-02-12
Attending: FAMILY MEDICINE
Payer: COMMERCIAL

## 2022-02-12 DIAGNOSIS — Z00.00 HEALTHCARE MAINTENANCE: ICD-10-CM

## 2022-02-12 LAB
ALBUMIN SERPL BCP-MCNC: 4.2 G/DL (ref 3.5–5.2)
ALP SERPL-CCNC: 63 U/L (ref 55–135)
ALT SERPL W/O P-5'-P-CCNC: 24 U/L (ref 10–44)
ANION GAP SERPL CALC-SCNC: 11 MMOL/L (ref 8–16)
AST SERPL-CCNC: 19 U/L (ref 10–40)
BASOPHILS # BLD AUTO: 0.04 K/UL (ref 0–0.2)
BASOPHILS NFR BLD: 0.7 % (ref 0–1.9)
BILIRUB SERPL-MCNC: 0.6 MG/DL (ref 0.1–1)
BUN SERPL-MCNC: 15 MG/DL (ref 6–20)
CALCIUM SERPL-MCNC: 9.9 MG/DL (ref 8.7–10.5)
CHLORIDE SERPL-SCNC: 105 MMOL/L (ref 95–110)
CHOLEST SERPL-MCNC: 231 MG/DL (ref 120–199)
CHOLEST/HDLC SERPL: 4.7 {RATIO} (ref 2–5)
CO2 SERPL-SCNC: 24 MMOL/L (ref 23–29)
CREAT SERPL-MCNC: 0.7 MG/DL (ref 0.5–1.4)
DIFFERENTIAL METHOD: NORMAL
EOSINOPHIL # BLD AUTO: 0.3 K/UL (ref 0–0.5)
EOSINOPHIL NFR BLD: 4.9 % (ref 0–8)
ERYTHROCYTE [DISTWIDTH] IN BLOOD BY AUTOMATED COUNT: 12.3 % (ref 11.5–14.5)
EST. GFR  (AFRICAN AMERICAN): >60 ML/MIN/1.73 M^2
EST. GFR  (NON AFRICAN AMERICAN): >60 ML/MIN/1.73 M^2
ESTIMATED AVG GLUCOSE: 97 MG/DL (ref 68–131)
GLUCOSE SERPL-MCNC: 90 MG/DL (ref 70–110)
HBA1C MFR BLD: 5 % (ref 4–5.6)
HCT VFR BLD AUTO: 45 % (ref 40–54)
HDLC SERPL-MCNC: 49 MG/DL (ref 40–75)
HDLC SERPL: 21.2 % (ref 20–50)
HGB BLD-MCNC: 14.7 G/DL (ref 14–18)
IMM GRANULOCYTES # BLD AUTO: 0.01 K/UL (ref 0–0.04)
IMM GRANULOCYTES NFR BLD AUTO: 0.2 % (ref 0–0.5)
LDLC SERPL CALC-MCNC: 157.4 MG/DL (ref 63–159)
LYMPHOCYTES # BLD AUTO: 2.3 K/UL (ref 1–4.8)
LYMPHOCYTES NFR BLD: 41.6 % (ref 18–48)
MCH RBC QN AUTO: 30.6 PG (ref 27–31)
MCHC RBC AUTO-ENTMCNC: 32.7 G/DL (ref 32–36)
MCV RBC AUTO: 94 FL (ref 82–98)
MONOCYTES # BLD AUTO: 0.4 K/UL (ref 0.3–1)
MONOCYTES NFR BLD: 7.8 % (ref 4–15)
NEUTROPHILS # BLD AUTO: 2.5 K/UL (ref 1.8–7.7)
NEUTROPHILS NFR BLD: 44.8 % (ref 38–73)
NONHDLC SERPL-MCNC: 182 MG/DL
NRBC BLD-RTO: 0 /100 WBC
PLATELET # BLD AUTO: 224 K/UL (ref 150–450)
PMV BLD AUTO: 10.2 FL (ref 9.2–12.9)
POTASSIUM SERPL-SCNC: 4.1 MMOL/L (ref 3.5–5.1)
PROT SERPL-MCNC: 7.4 G/DL (ref 6–8.4)
RBC # BLD AUTO: 4.8 M/UL (ref 4.6–6.2)
SODIUM SERPL-SCNC: 140 MMOL/L (ref 136–145)
TRIGL SERPL-MCNC: 123 MG/DL (ref 30–150)
WBC # BLD AUTO: 5.5 K/UL (ref 3.9–12.7)

## 2022-02-12 PROCEDURE — 36415 COLL VENOUS BLD VENIPUNCTURE: CPT | Mod: PO | Performed by: FAMILY MEDICINE

## 2022-02-12 PROCEDURE — 80061 LIPID PANEL: CPT | Performed by: FAMILY MEDICINE

## 2022-02-12 PROCEDURE — 83036 HEMOGLOBIN GLYCOSYLATED A1C: CPT | Performed by: FAMILY MEDICINE

## 2022-02-12 PROCEDURE — 85025 COMPLETE CBC W/AUTO DIFF WBC: CPT | Performed by: FAMILY MEDICINE

## 2022-02-12 PROCEDURE — 80053 COMPREHEN METABOLIC PANEL: CPT | Performed by: FAMILY MEDICINE

## 2022-09-04 ENCOUNTER — OFFICE VISIT (OUTPATIENT)
Dept: URGENT CARE | Facility: CLINIC | Age: 42
End: 2022-09-04
Payer: COMMERCIAL

## 2022-09-04 VITALS
HEART RATE: 62 BPM | TEMPERATURE: 97 F | SYSTOLIC BLOOD PRESSURE: 118 MMHG | BODY MASS INDEX: 25.9 KG/M2 | RESPIRATION RATE: 16 BRPM | WEIGHT: 165 LBS | OXYGEN SATURATION: 98 % | HEIGHT: 67 IN | DIASTOLIC BLOOD PRESSURE: 75 MMHG

## 2022-09-04 DIAGNOSIS — R07.81 RIB PAIN ON RIGHT SIDE: ICD-10-CM

## 2022-09-04 DIAGNOSIS — S22.31XA CLOSED FRACTURE OF ONE RIB OF RIGHT SIDE, INITIAL ENCOUNTER: Primary | ICD-10-CM

## 2022-09-04 PROCEDURE — 71100 XR RIBS 2 VIEW RIGHT: ICD-10-PCS | Mod: RT,S$GLB,, | Performed by: RADIOLOGY

## 2022-09-04 PROCEDURE — 1159F PR MEDICATION LIST DOCUMENTED IN MEDICAL RECORD: ICD-10-PCS | Mod: CPTII,S$GLB,, | Performed by: NURSE PRACTITIONER

## 2022-09-04 PROCEDURE — 3078F DIAST BP <80 MM HG: CPT | Mod: CPTII,S$GLB,, | Performed by: NURSE PRACTITIONER

## 2022-09-04 PROCEDURE — 99214 OFFICE O/P EST MOD 30 MIN: CPT | Mod: S$GLB,,, | Performed by: NURSE PRACTITIONER

## 2022-09-04 PROCEDURE — 99214 PR OFFICE/OUTPT VISIT, EST, LEVL IV, 30-39 MIN: ICD-10-PCS | Mod: S$GLB,,, | Performed by: NURSE PRACTITIONER

## 2022-09-04 PROCEDURE — 1160F PR REVIEW ALL MEDS BY PRESCRIBER/CLIN PHARMACIST DOCUMENTED: ICD-10-PCS | Mod: CPTII,S$GLB,, | Performed by: NURSE PRACTITIONER

## 2022-09-04 PROCEDURE — 1159F MED LIST DOCD IN RCRD: CPT | Mod: CPTII,S$GLB,, | Performed by: NURSE PRACTITIONER

## 2022-09-04 PROCEDURE — 1160F RVW MEDS BY RX/DR IN RCRD: CPT | Mod: CPTII,S$GLB,, | Performed by: NURSE PRACTITIONER

## 2022-09-04 PROCEDURE — 3074F PR MOST RECENT SYSTOLIC BLOOD PRESSURE < 130 MM HG: ICD-10-PCS | Mod: CPTII,S$GLB,, | Performed by: NURSE PRACTITIONER

## 2022-09-04 PROCEDURE — 71100 X-RAY EXAM RIBS UNI 2 VIEWS: CPT | Mod: RT,S$GLB,, | Performed by: RADIOLOGY

## 2022-09-04 PROCEDURE — 3044F PR MOST RECENT HEMOGLOBIN A1C LEVEL <7.0%: ICD-10-PCS | Mod: CPTII,S$GLB,, | Performed by: NURSE PRACTITIONER

## 2022-09-04 PROCEDURE — 3078F PR MOST RECENT DIASTOLIC BLOOD PRESSURE < 80 MM HG: ICD-10-PCS | Mod: CPTII,S$GLB,, | Performed by: NURSE PRACTITIONER

## 2022-09-04 PROCEDURE — 3074F SYST BP LT 130 MM HG: CPT | Mod: CPTII,S$GLB,, | Performed by: NURSE PRACTITIONER

## 2022-09-04 PROCEDURE — 3008F BODY MASS INDEX DOCD: CPT | Mod: CPTII,S$GLB,, | Performed by: NURSE PRACTITIONER

## 2022-09-04 PROCEDURE — 3044F HG A1C LEVEL LT 7.0%: CPT | Mod: CPTII,S$GLB,, | Performed by: NURSE PRACTITIONER

## 2022-09-04 PROCEDURE — 3008F PR BODY MASS INDEX (BMI) DOCUMENTED: ICD-10-PCS | Mod: CPTII,S$GLB,, | Performed by: NURSE PRACTITIONER

## 2022-09-04 RX ORDER — CELECOXIB 200 MG/1
200 CAPSULE ORAL 2 TIMES DAILY PRN
Qty: 14 CAPSULE | Refills: 0 | Status: SHIPPED | OUTPATIENT
Start: 2022-09-04 | End: 2022-09-18

## 2022-09-04 RX ORDER — KETOROLAC TROMETHAMINE 30 MG/ML
30 INJECTION, SOLUTION INTRAMUSCULAR; INTRAVENOUS
Status: SHIPPED | OUTPATIENT
Start: 2022-09-04 | End: 2022-09-09

## 2022-09-04 RX ORDER — HYDROCODONE BITARTRATE AND ACETAMINOPHEN 5; 325 MG/1; MG/1
1 TABLET ORAL EVERY 8 HOURS PRN
Qty: 12 TABLET | Refills: 0 | Status: SHIPPED | OUTPATIENT
Start: 2022-09-04 | End: 2022-09-08

## 2022-09-04 NOTE — PROGRESS NOTES
"Subjective:       Patient ID: Preet Barrientos is a 42 y.o. male.    Vitals:  height is 5' 7" (1.702 m) and weight is 74.8 kg (165 lb). His oral temperature is 97.2 °F (36.2 °C). His blood pressure is 118/75 and his pulse is 62. His respiration is 16 and oxygen saturation is 98%.     Chief Complaint: Rib Injury    42-year-old male seen today for right lower rib pain.  States he was working on his boat and he leaned over an open hat to reach something down in the [place].  States he felt a sudden pain over the lower 2 ribs on his right side.  Denies taking any medication.  States pain has not improved    Constitution: Negative for chills and fever.   Cardiovascular:  Negative for chest pain and sob on exertion.   Respiratory:  Negative for cough and shortness of breath.    Gastrointestinal:  Negative for abdominal pain.   Musculoskeletal:  Positive for pain and trauma.   Neurological:  Negative for dizziness, light-headedness, passing out, disorientation and altered mental status.   Psychiatric/Behavioral:  Negative for altered mental status, disorientation and confusion.      Objective:      Physical Exam   Constitutional: He is oriented to person, place, and time. He appears well-developed. He is cooperative.  Non-toxic appearance. He does not appear ill. No distress.   HENT:   Head: Normocephalic and atraumatic.   Ears:   Right Ear: External ear normal.   Left Ear: External ear normal.   Nose: Nose normal.   Mouth/Throat: Oropharynx is clear and moist and mucous membranes are normal. Mucous membranes are moist. Oropharynx is clear.   Eyes: Conjunctivae and lids are normal. No scleral icterus.   Neck: Trachea normal and phonation normal. Neck supple.   Cardiovascular: Normal rate, regular rhythm, normal heart sounds and normal pulses.   Pulmonary/Chest: Effort normal and breath sounds normal. No stridor. No respiratory distress. He has no wheezes. He has no rhonchi. He has no rales. He exhibits tenderness (Right lower " ribs) and bony tenderness. He exhibits no mass, no crepitus, no edema, no deformity, no swelling and no retraction.             Comments: No splinting respirations or altered inspiratory effort noted    Abdominal: Normal appearance and bowel sounds are normal. He exhibits no distension and no mass. Soft. There is no abdominal tenderness. There is no rebound and no guarding. No hernia.   Musculoskeletal:         General: No deformity.   Neurological: He is alert and oriented to person, place, and time. He has normal strength and normal reflexes. No sensory deficit.   Skin: Skin is warm, dry, intact and not diaphoretic. Capillary refill takes 2 to 3 seconds.   Psychiatric: His speech is normal and behavior is normal. Judgment and thought content normal.   Nursing note and vitals reviewed.      Assessment:       1. Closed fracture of one rib of right side, initial encounter    2. Rib pain on right side          Plan:         Closed fracture of one rib of right side, initial encounter  -     celecoxib (CELEBREX) 200 MG capsule; Take 1 capsule (200 mg total) by mouth 2 (two) times daily as needed for Pain.  Dispense: 14 capsule; Refill: 0  -     HYDROcodone-acetaminophen (NORCO) 5-325 mg per tablet; Take 1 tablet by mouth every 8 (eight) hours as needed (breakthrough pain).  Dispense: 12 tablet; Refill: 0    Rib pain on right side  -     ketorolac injection 30 mg  -     X-Ray Ribs 2 View Right; Future; Expected date: 09/04/2022  -     celecoxib (CELEBREX) 200 MG capsule; Take 1 capsule (200 mg total) by mouth 2 (two) times daily as needed for Pain.  Dispense: 14 capsule; Refill: 0  -     HYDROcodone-acetaminophen (NORCO) 5-325 mg per tablet; Take 1 tablet by mouth every 8 (eight) hours as needed (breakthrough pain).  Dispense: 12 tablet; Refill: 0          XR RIBS 2 VIEW RIGHT     CLINICAL HISTORY:  rib pain;Pleurodynia     TECHNIQUE:  AP chest and two views of the right ribs.     COMPARISON:  Chest radiograph  07/06/2020     FINDINGS:  Minimally displaced fracture of the lateral right 9th rib.  No pneumothorax.     Impression:     As above.        Electronically signed by: Bakari Baptiste  Date:                                            09/04/2022  Time:                                           12:30       I have discussed the test results and physical exam findings with the patient. We discussed symptom monitoring, conservative treatment options, medication use, emergency precautions, and follow up orders.  He verbalized understanding and agreement with the plan of care.          Attempted to give patient Toradol injection in clinic for pain.  Patient refused injections stated he does not need this.    Increase clear fluid intake  May apply ice to affected area for 15 minutes every 2 hours.  After 48 hours may progress to moist heat or heating pad.  Take care not to fall sleep on heating pad as this may cause severe burns   Practice deep breathing for 2 minutes every hour until rib is healed.  Takes Celebrex as directed.  Take each dose with a full meal.  May take Norco for severe pain not covered by celebrex.  Go to the ER for shortness of breath, coughing up blood, dizziness, or other emergent concern.  Follow-up primary care provider for re-evaluation and further management  Return to clinic for new or worse symptoms

## 2022-09-04 NOTE — PATIENT INSTRUCTIONS
Increase clear fluid intake  May apply ice to affected area for 15 minutes every 2 hours.  After 48 hours may progress to moist heat or heating pad.  Take care not to fall sleep on heating pad as this may cause severe burns   Practice deep breathing for 2 minutes every hour until rib is healed.  Takes Celebrex as directed.  Take each dose with a full meal.  May take Norco for severe pain not covered by celebrex.  Go to the ER for shortness of breath, coughing up blood, dizziness, or other emergent concern.  Follow-up primary care provider for re-evaluation and further management  Return to clinic for new or worse symptoms

## 2023-03-25 ENCOUNTER — CLINICAL SUPPORT (OUTPATIENT)
Dept: OTHER | Facility: CLINIC | Age: 43
End: 2023-03-25
Payer: COMMERCIAL

## 2023-03-25 DIAGNOSIS — Z00.8 ENCOUNTER FOR OTHER GENERAL EXAMINATION: ICD-10-CM

## 2023-03-26 VITALS
SYSTOLIC BLOOD PRESSURE: 139 MMHG | WEIGHT: 151 LBS | BODY MASS INDEX: 23.7 KG/M2 | DIASTOLIC BLOOD PRESSURE: 79 MMHG | HEIGHT: 67 IN

## 2023-03-26 LAB
HDLC SERPL-MCNC: 68 MG/DL
POC CHOLESTEROL, LDL (DOCK): 88 MG/DL
POC CHOLESTEROL, TOTAL: 176 MG/DL
POC GLUCOSE, FASTING: 130 MG/DL (ref 60–110)
TRIGL SERPL-MCNC: 114 MG/DL

## 2023-07-22 ENCOUNTER — HOSPITAL ENCOUNTER (EMERGENCY)
Facility: HOSPITAL | Age: 43
Discharge: HOME | End: 2023-07-22
Attending: EMERGENCY MEDICINE
Payer: COMMERCIAL

## 2023-07-22 VITALS
DIASTOLIC BLOOD PRESSURE: 61 MMHG | BODY MASS INDEX: 23.7 KG/M2 | HEART RATE: 68 BPM | WEIGHT: 151 LBS | TEMPERATURE: 98 F | OXYGEN SATURATION: 97 % | SYSTOLIC BLOOD PRESSURE: 126 MMHG | HEIGHT: 67 IN | RESPIRATION RATE: 18 BRPM

## 2023-07-22 DIAGNOSIS — R00.2 PALPITATIONS: Primary | ICD-10-CM

## 2023-07-22 DIAGNOSIS — R06.02 SOB (SHORTNESS OF BREATH): ICD-10-CM

## 2023-07-22 LAB
ALBUMIN SERPL BCP-MCNC: 4.3 G/DL (ref 3.5–5.2)
ALP SERPL-CCNC: 62 U/L (ref 55–135)
ALT SERPL W/O P-5'-P-CCNC: 13 U/L (ref 10–44)
ANION GAP SERPL CALC-SCNC: 11 MMOL/L (ref 8–16)
AST SERPL-CCNC: 23 U/L (ref 10–40)
BASOPHILS # BLD AUTO: 0.06 K/UL (ref 0–0.2)
BASOPHILS NFR BLD: 0.8 % (ref 0–1.9)
BILIRUB SERPL-MCNC: 0.5 MG/DL (ref 0.1–1)
BNP SERPL-MCNC: <10 PG/ML (ref 0–99)
BUN SERPL-MCNC: 15 MG/DL (ref 6–20)
CALCIUM SERPL-MCNC: 8.8 MG/DL (ref 8.7–10.5)
CHLORIDE SERPL-SCNC: 104 MMOL/L (ref 95–110)
CO2 SERPL-SCNC: 24 MMOL/L (ref 23–29)
CREAT SERPL-MCNC: 0.8 MG/DL (ref 0.5–1.4)
DIFFERENTIAL METHOD: ABNORMAL
EOSINOPHIL # BLD AUTO: 0.3 K/UL (ref 0–0.5)
EOSINOPHIL NFR BLD: 4.1 % (ref 0–8)
ERYTHROCYTE [DISTWIDTH] IN BLOOD BY AUTOMATED COUNT: 12.7 % (ref 11.5–14.5)
EST. GFR  (NO RACE VARIABLE): >60 ML/MIN/1.73 M^2
GLUCOSE SERPL-MCNC: 108 MG/DL (ref 70–110)
HCT VFR BLD AUTO: 41.6 % (ref 40–54)
HGB BLD-MCNC: 13.8 G/DL (ref 14–18)
IMM GRANULOCYTES # BLD AUTO: 0.02 K/UL (ref 0–0.04)
IMM GRANULOCYTES NFR BLD AUTO: 0.3 % (ref 0–0.5)
LYMPHOCYTES # BLD AUTO: 2.2 K/UL (ref 1–4.8)
LYMPHOCYTES NFR BLD: 27.3 % (ref 18–48)
MCH RBC QN AUTO: 30.9 PG (ref 27–31)
MCHC RBC AUTO-ENTMCNC: 33.2 G/DL (ref 32–36)
MCV RBC AUTO: 93 FL (ref 82–98)
MONOCYTES # BLD AUTO: 0.5 K/UL (ref 0.3–1)
MONOCYTES NFR BLD: 5.9 % (ref 4–15)
NEUTROPHILS # BLD AUTO: 4.9 K/UL (ref 1.8–7.7)
NEUTROPHILS NFR BLD: 61.6 % (ref 38–73)
NRBC BLD-RTO: 0 /100 WBC
PLATELET # BLD AUTO: 230 K/UL (ref 150–450)
PMV BLD AUTO: 9.4 FL (ref 9.2–12.9)
POTASSIUM SERPL-SCNC: 4 MMOL/L (ref 3.5–5.1)
PROT SERPL-MCNC: 7.2 G/DL (ref 6–8.4)
RBC # BLD AUTO: 4.46 M/UL (ref 4.6–6.2)
SARS-COV-2 RDRP RESP QL NAA+PROBE: NEGATIVE
SODIUM SERPL-SCNC: 139 MMOL/L (ref 136–145)
TROPONIN I SERPL DL<=0.01 NG/ML-MCNC: <0.006 NG/ML (ref 0–0.03)
TROPONIN I SERPL DL<=0.01 NG/ML-MCNC: <0.006 NG/ML (ref 0–0.03)
WBC # BLD AUTO: 8 K/UL (ref 3.9–12.7)

## 2023-07-22 PROCEDURE — 83880 ASSAY OF NATRIURETIC PEPTIDE: CPT | Performed by: PHYSICIAN ASSISTANT

## 2023-07-22 PROCEDURE — U0002 COVID-19 LAB TEST NON-CDC: HCPCS | Performed by: PHYSICIAN ASSISTANT

## 2023-07-22 PROCEDURE — 84484 ASSAY OF TROPONIN QUANT: CPT | Performed by: PHYSICIAN ASSISTANT

## 2023-07-22 PROCEDURE — 93010 EKG 12-LEAD: ICD-10-PCS | Mod: ,,, | Performed by: GENERAL PRACTICE

## 2023-07-22 PROCEDURE — 99285 EMERGENCY DEPT VISIT HI MDM: CPT | Mod: 25

## 2023-07-22 PROCEDURE — 93005 ELECTROCARDIOGRAM TRACING: CPT

## 2023-07-22 PROCEDURE — 36415 COLL VENOUS BLD VENIPUNCTURE: CPT | Performed by: PHYSICIAN ASSISTANT

## 2023-07-22 PROCEDURE — 93010 ELECTROCARDIOGRAM REPORT: CPT | Mod: ,,, | Performed by: GENERAL PRACTICE

## 2023-07-22 PROCEDURE — 25000003 PHARM REV CODE 250: Performed by: PHYSICIAN ASSISTANT

## 2023-07-22 PROCEDURE — 85025 COMPLETE CBC W/AUTO DIFF WBC: CPT | Performed by: PHYSICIAN ASSISTANT

## 2023-07-22 PROCEDURE — 94760 N-INVAS EAR/PLS OXIMETRY 1: CPT

## 2023-07-22 PROCEDURE — 80053 COMPREHEN METABOLIC PANEL: CPT | Performed by: PHYSICIAN ASSISTANT

## 2023-07-22 RX ORDER — ASPIRIN 325 MG
325 TABLET ORAL
Status: COMPLETED | OUTPATIENT
Start: 2023-07-22 | End: 2023-07-22

## 2023-07-22 RX ADMIN — ASPIRIN 325 MG ORAL TABLET 325 MG: 325 PILL ORAL at 01:07

## 2023-07-22 NOTE — ED PROVIDER NOTES
Encounter Date: 7/22/2023       History     Chief Complaint   Patient presents with    Shortness of Breath     X 2 hours / similar incident 1 month ago     42 year old male no pertinent past medical history presents with shortness a breath palpitations prior to arrival.  Patient reports he is had 2 or 3 similar episodes in the past which he and his wife suspect is secondary to his anxiety and panic attacks.  He has been under lot of stress lately with work and home.  He reports symptoms last for about an hour and spontaneous resolved.  Patient is a he is asymptomatic currently.  Denies any chest pain shortness of breath nausea vomiting fevers chills cough runny nose congestion abdominal pain dysuria hematuria diarrhea constipation or any other symptoms at this time.    The history is provided by the patient.   Review of patient's allergies indicates:  No Known Allergies  No past medical history on file.  No past surgical history on file.  No family history on file.  Social History     Tobacco Use    Smoking status: Every Day    Smokeless tobacco: Never   Substance Use Topics    Alcohol use: Yes     Review of Systems   Constitutional:  Negative for activity change, diaphoresis and fever.   HENT:  Negative for drooling, rhinorrhea, sore throat and trouble swallowing.    Eyes:  Negative for pain and visual disturbance.   Respiratory:  Positive for shortness of breath. Negative for cough and stridor.    Cardiovascular:  Positive for palpitations. Negative for chest pain and leg swelling.   Gastrointestinal:  Negative for abdominal distention, abdominal pain, constipation and vomiting.   Genitourinary:  Negative for dysuria, hematuria and penile discharge.   Musculoskeletal:  Negative for gait problem.   Skin:  Negative for rash.   Neurological:  Negative for seizures, facial asymmetry and headaches.   Psychiatric/Behavioral:  Negative for hallucinations and suicidal ideas.      Physical Exam     Initial Vitals [07/22/23  1026]   BP Pulse Resp Temp SpO2   133/70 81 18 97.7 °F (36.5 °C) 97 %      MAP       --         Physical Exam    Nursing note and vitals reviewed.  Constitutional: He appears well-developed. No distress.   HENT:   Head: Normocephalic and atraumatic.   Nose: Nose normal.   Eyes: EOM are normal.   Neck: Neck supple. No tracheal deviation present. No JVD present.   Cardiovascular:  Normal rate, regular rhythm, normal heart sounds and intact distal pulses.     Exam reveals no gallop and no friction rub.       No murmur heard.  Pulmonary/Chest: Breath sounds normal. No respiratory distress. He has no wheezes. He has no rhonchi. He has no rales.   Abdominal: Abdomen is soft. Bowel sounds are normal. He exhibits no distension. There is no abdominal tenderness. There is no rebound and no guarding.   Musculoskeletal:         General: Normal range of motion.      Cervical back: Neck supple.     Neurological: He is alert and oriented to person, place, and time. He has normal strength. No cranial nerve deficit or sensory deficit.   Skin: Skin is warm and dry. Capillary refill takes less than 2 seconds. No rash noted.   Psychiatric: He has a normal mood and affect.       ED Course   Procedures  Labs Reviewed   CBC W/ AUTO DIFFERENTIAL - Abnormal; Notable for the following components:       Result Value    RBC 4.46 (*)     Hemoglobin 13.8 (*)     All other components within normal limits   COMPREHENSIVE METABOLIC PANEL   TROPONIN I   TROPONIN I   B-TYPE NATRIURETIC PEPTIDE   SARS-COV-2 RNA AMPLIFICATION, QUAL          Imaging Results              X-Ray Chest AP Portable (Final result)  Result time 07/22/23 14:09:21      Final result by Yolande Kelly MD (07/22/23 14:09:21)                   Impression:      No acute cardiopulmonary disease      Electronically signed by: Yolande Kelly MD  Date:    07/22/2023  Time:    14:09               Narrative:    EXAMINATION:  XR CHEST AP PORTABLE    CLINICAL HISTORY:  Chest  Pain;    TECHNIQUE:  Single frontal view of the chest was performed.    COMPARISON:  09/04/2022    FINDINGS:  The heart is not enlarged.  The lungs are well expanded and clear.  The costophrenic sulci are sharp.                                       Medications   aspirin tablet 325 mg (325 mg Oral Given 7/22/23 1349)     Medical Decision Making:   ED Management:  41 yo M presents with palpitations.  EKG: No STEMI and no evidence of Brugada's sign, delta wave, epsilon wave, significantly prolonged QTc, or malignant arrhythmia.  Based on H&P and testing, this patient appears to be low risk for emergent causes of palpitations such as, but not limited to, a malignant cardiac arrhythmia, ACS, pulmonary embolism, thyrotoxicosis, PNA, PTX.  Labs reassuring with no severe electrolyte abnormalities. CXR negative. Do not suspect PE.     The patient has been given strict return precautions and understands the need for further outpatient testing and treatment.             ED Course as of 07/22/23 1643   Sat Jul 22, 2023   1436 X-Ray Chest AP Portable [BD]   1438 Impression:     No acute cardiopulmonary disease        Electronically signed by: Yolande Kelly MD  Date:                                            07/22/2023  Time:                                           14:09 [BD]      ED Course User Index  [BD] Alban Jorge MD                 Clinical Impression:   Final diagnoses:  [R06.02] SOB (shortness of breath)  [R00.2] Palpitations (Primary)        ED Disposition Condition    Discharge Stable          ED Prescriptions    None       Follow-up Information       Follow up With Specialties Details Why Contact Info Additional Information    Juan Carlos Benitez MD Family Medicine Go in 2 days  [address] [facility] [address]  [phone]       [facility] Go to  As needed, If symptoms worsen [address] [place] [address] [address] [place] [id number] 1st floor             Alban Jorge  MD  07/22/23 6052       Alban Jorge MD  07/22/23 5328

## 2023-07-22 NOTE — FIRST PROVIDER EVALUATION
Emergency Department TeleTriage Encounter Note      CHIEF COMPLAINT    Chief Complaint   Patient presents with    Shortness of Breath     X 2 hours / similar incident 1 month ago       VITAL SIGNS   Initial Vitals [07/22/23 1026]   BP Pulse Resp Temp SpO2   133/70 81 18 97.7 °F (36.5 °C) 97 %      MAP       --            ALLERGIES    Review of patient's allergies indicates:  No Known Allergies    PROVIDER TRIAGE NOTE  Patient presents with constant left chest pain and shortness of breath since this morning. Slight cough today. No pain with inspiration      ORDERS  Labs Reviewed - No data to display    ED Orders (720h ago, onward)      Start Ordered     Status Ordering Provider    07/22/23 1029 07/22/23 1028  EKG 12-lead  Once         Completed by JUANY SAMPSON on 7/22/2023 at 10:35 AM MIKA COREA              Virtual Visit Note: The provider triage portion of this emergency department evaluation and documentation was performed via [employer], a HIPAA-compliant telemedicine application, in concert with a tele-presenter in the room. A face to face patient evaluation with one of my colleagues will occur once the patient is placed in an emergency department room.      DISCLAIMER: This note was prepared with [employer] voice recognition transcription software. Garbled syntax, mangled pronouns, and other bizarre constructions may be attributed to that software system.

## 2024-01-22 ENCOUNTER — PATIENT MESSAGE (OUTPATIENT)
Dept: FAMILY MEDICINE | Facility: CLINIC | Age: 44
End: 2024-01-22
Payer: COMMERCIAL

## 2024-02-20 ENCOUNTER — CLINICAL SUPPORT (OUTPATIENT)
Dept: OTHER | Facility: CLINIC | Age: 44
End: 2024-02-20
Payer: COMMERCIAL

## 2024-02-20 DIAGNOSIS — Z00.8 ENCOUNTER FOR OTHER GENERAL EXAMINATION: ICD-10-CM

## 2024-02-23 VITALS
SYSTOLIC BLOOD PRESSURE: 133 MMHG | WEIGHT: 143 LBS | DIASTOLIC BLOOD PRESSURE: 81 MMHG | BODY MASS INDEX: 21.18 KG/M2 | HEIGHT: 69 IN

## 2024-02-23 LAB
GLUCOSE SERPL-MCNC: 103 MG/DL (ref 60–140)
HDLC SERPL-MCNC: 65 MG/DL
POC CHOLESTEROL, LDL (DOCK): 106 MG/DL
POC CHOLESTEROL, TOTAL: 186 MG/DL
TRIGL SERPL-MCNC: 84 MG/DL

## 2024-10-11 ENCOUNTER — NURSE TRIAGE (OUTPATIENT)
Dept: ADMINISTRATIVE | Facility: CLINIC | Age: 44
End: 2024-10-11
Payer: COMMERCIAL

## 2024-10-11 NOTE — TELEPHONE ENCOUNTER
Pt reports he was recently dx w/ pneumonia. Reports he is currently having chest pain that is worse than when diagnosed. Advised, per protocol and verbalizes understanding. Reports he is unable to be evaluated now due to being at work. 911 protocols provided and I have advised I would route encounter to PCP for follow up in this regard.    Reason for Disposition   New-onset or worsening chest pain    Additional Information   Negative: SEVERE difficulty breathing (e.g., struggling for each breath, speaks in single words)   Negative: Bluish (or gray) lips or face now   Negative: Difficult to awaken or acting confused (e.g., disoriented, slurred speech)   Negative: Stridor (harsh sound while breathing in)   Negative: Slow, shallow and weak breathing   Negative: Sounds like a life-threatening emergency to the triager    Protocols used: Pneumonia Follow-up Call-[facility]

## 2024-10-11 NOTE — TELEPHONE ENCOUNTER
Follow up call placed to patient.  Advised to be seen at ER as soon as possible.  Patient verbalized understanding.